# Patient Record
Sex: MALE | URBAN - METROPOLITAN AREA
[De-identification: names, ages, dates, MRNs, and addresses within clinical notes are randomized per-mention and may not be internally consistent; named-entity substitution may affect disease eponyms.]

---

## 2023-02-16 ENCOUNTER — OUT OF OFFICE VISIT (OUTPATIENT)
Dept: URBAN - METROPOLITAN AREA MEDICAL CENTER 12 | Facility: MEDICAL CENTER | Age: 33
End: 2023-02-16
Payer: COMMERCIAL

## 2023-02-16 DIAGNOSIS — R79.89 ABNORMAL BILIRUBIN TEST: ICD-10-CM

## 2023-02-16 DIAGNOSIS — R74.8 ABNORMAL ALKALINE PHOSPHATASE TEST: ICD-10-CM

## 2023-02-16 DIAGNOSIS — R11.2 ACUTE NAUSEA WITH NONBILIOUS VOMITING: ICD-10-CM

## 2023-02-16 DIAGNOSIS — R74.01 ABNORMAL/ELEVATED TRANSAMINASE (SGOT, AMINOTRANSFERASE): ICD-10-CM

## 2023-02-16 PROCEDURE — 99223 1ST HOSP IP/OBS HIGH 75: CPT

## 2023-02-16 PROCEDURE — G8427 DOCREV CUR MEDS BY ELIG CLIN: HCPCS

## 2023-02-17 ENCOUNTER — OUT OF OFFICE VISIT (OUTPATIENT)
Dept: URBAN - METROPOLITAN AREA MEDICAL CENTER 12 | Facility: MEDICAL CENTER | Age: 33
End: 2023-02-17
Payer: COMMERCIAL

## 2023-02-17 DIAGNOSIS — E80.6 HYPERBILIRUBINEMIA: ICD-10-CM

## 2023-02-17 DIAGNOSIS — B16.9 ACUTE HEPATITIS B: ICD-10-CM

## 2023-02-17 DIAGNOSIS — R74.01 ABNORMAL/ELEVATED TRANSAMINASE (SGOT, AMINOTRANSFERASE): ICD-10-CM

## 2023-02-17 DIAGNOSIS — R79.89 ABNORMAL BILIRUBIN TEST: ICD-10-CM

## 2023-02-17 PROCEDURE — 99233 SBSQ HOSP IP/OBS HIGH 50: CPT

## 2023-02-18 ENCOUNTER — OUT OF OFFICE VISIT (OUTPATIENT)
Dept: URBAN - METROPOLITAN AREA MEDICAL CENTER 12 | Facility: MEDICAL CENTER | Age: 33
End: 2023-02-18
Payer: COMMERCIAL

## 2023-02-18 DIAGNOSIS — B16.9 ACUTE HEPATITIS B: ICD-10-CM

## 2023-02-18 DIAGNOSIS — R74.8 ABNORMAL ALKALINE PHOSPHATASE TEST: ICD-10-CM

## 2023-02-18 DIAGNOSIS — R74.01 ABNORMAL/ELEVATED TRANSAMINASE (SGOT, AMINOTRANSFERASE): ICD-10-CM

## 2023-02-18 PROCEDURE — 99232 SBSQ HOSP IP/OBS MODERATE 35: CPT | Performed by: INTERNAL MEDICINE

## 2023-03-09 ENCOUNTER — OFFICE VISIT (OUTPATIENT)
Dept: URBAN - METROPOLITAN AREA CLINIC 86 | Facility: CLINIC | Age: 33
End: 2023-03-09

## 2023-03-09 PROBLEM — 14783006: Status: ACTIVE | Noted: 2023-03-08

## 2023-03-09 PROBLEM — 76795007: Status: ACTIVE | Noted: 2023-03-07

## 2023-03-09 PROBLEM — 707724006: Status: ACTIVE | Noted: 2023-03-08

## 2023-03-09 PROBLEM — 443913008: Status: ACTIVE | Noted: 2023-03-08

## 2023-03-09 NOTE — HPI-TODAY'S VISIT:
This is a 32 year old male seen recent in the hospital due to acute hepatitis b  Seen in the hospital with see and found to have acute hep b. PMH significant for asthma. sickle cell trait. He of note did confirm that he has no history of any intravenous drug usage and was usually mainly the oral and inhalational opiates.  He does admit to having had a tattoo in December on his neck area.   Surgical history denies.     Family history remarkable for mother and father being .   Social history he is in a long-term relationship and has 4 children.  Denies any regular alcohol usage.  Does admit to the marijuana.  Does smoke a pack per day.  Says he has a history of opiate oral at inhalational usage but not recently.  Denied any medical to me usage.  Denied any intravenous usage.  23 bili 8.7, alt 1076, ast 819, alp 185 wbc 3.9, hgb 12, hiv negative, hep d negative, hep be antibody negative. ferritin 1845, olu, asma , ama negative.   CT 2023 Liver: Diffusely decreased density.   Gallbladder/Biliary Tree: Small volume pericholecystic fluid. Gallbladder wall thickening better appreciated on comparison ultrasound. No intra or extrahepatic biliary ductal dilatation.   Spleen: Normal.   Pancreas: Normal.   Adrenal Glands: Normal.   Kidneys/Ureters: Normal.   Gastrointestinal: Normal.    Bladder: Normal.   Prostate/Seminal Vesicles: Normal.   Lymph Nodes: Normal.   Vessels: Normal.   Peritoneum/Retroperitoneum: Normal.   Bones/Soft Tissues: Normal.   IMPRESSION: Suboptimal contrast bolus timing limits evaluation of all solid organs and hollow viscera.   1.  Decreased hepatic attenuation, most commonly due to hepatic steatosis. No hepatic lesions or other focal abnormalities are seen on this single phase exam.  2.  Small volume pericholecystic fluid, again favored to represent reactive changes in the setting of hepatic dysfunction. No other evidence of gallbladder disease.   US with mild hypoechoic liver, coarse

## 2023-06-01 ENCOUNTER — LAB OUTSIDE AN ENCOUNTER (OUTPATIENT)
Dept: URBAN - METROPOLITAN AREA CLINIC 86 | Facility: CLINIC | Age: 33
End: 2023-06-01